# Patient Record
Sex: FEMALE | URBAN - NONMETROPOLITAN AREA
[De-identification: names, ages, dates, MRNs, and addresses within clinical notes are randomized per-mention and may not be internally consistent; named-entity substitution may affect disease eponyms.]

---

## 2018-01-09 ENCOUNTER — OFFICE VISIT (OUTPATIENT)
Dept: PSYCHIATRY | Age: 33
End: 2018-01-09
Payer: MEDICAID

## 2018-01-09 VITALS
BODY MASS INDEX: 20.38 KG/M2 | OXYGEN SATURATION: 100 % | WEIGHT: 115 LBS | SYSTOLIC BLOOD PRESSURE: 142 MMHG | HEIGHT: 63 IN | DIASTOLIC BLOOD PRESSURE: 88 MMHG | HEART RATE: 89 BPM

## 2018-01-09 DIAGNOSIS — F32.A DEPRESSION, UNSPECIFIED DEPRESSION TYPE: ICD-10-CM

## 2018-01-09 DIAGNOSIS — F19.11 DRUG ABUSE IN REMISSION (HCC): Primary | ICD-10-CM

## 2018-01-09 PROCEDURE — 99999 PR OFFICE/OUTPT VISIT,PROCEDURE ONLY: CPT | Performed by: COUNSELOR

## 2018-01-09 PROCEDURE — 90791 PSYCH DIAGNOSTIC EVALUATION: CPT | Performed by: COUNSELOR

## 2018-01-09 RX ORDER — LEVONORGESTREL AND ETHINYL ESTRADIOL 0.15-0.03
KIT ORAL
COMMUNITY
Start: 2017-12-28

## 2018-01-09 NOTE — PROGRESS NOTES
Initial Session Note  [facility]  2018  8:26 AM      Time spent with Patient: 60 minutes  This is patient's first  Therapy appointment. Reason for Consult:  depression, anxiety and stress  Referring Provider: No referring provider defined for this encounter. Pt provided informed consent for the behavioral health program. Discussed with patient model of service to include the limits of confidentiality (i.e. abuse reporting, suicide intervention, etc.) and short-term intervention focused approach. Discussed no show and late cancellation policy. Pt indicated understanding. Yesi Paulson ,a 29 y.o. female, for initial evaluation visit. Reason:    Pt was at [facility] for 28 days and was discharged on  Dec 18, 2017. Pt states she was in rehab for pills (opiates). \"That put everything in to action to get things addressed that never were. \" Pt states she has \"always\" been depressed. However, 6 years ago the [place] of her life\"  and it made her depression and anxiety increase significantly. She is also having episodes of anger and is \"snapping\" a lot. Pt denies SI, HI and AVH at this time. Therapist recommends IOP to pt. However, she lives in [place], [place] and doesn't believe she could make it here 3 times per week. She also prefers 1:1 counseling as she didn't like groups at [facility]     Social History:  Born and raised in [place] by biological parents until her parents  when she was a teenager. Her father was a drug addict. Pt has an older brother and sister. Pt has never been  and has no children. Current Medications:  Scheduled Meds: No current outpatient prescriptions on file.       History:     Past Psychiatric History:   Previous therapy: denies  Previous psychiatric treatment and medication trials: denies  Previous psychiatric hospitalizations: no  Previous diagnoses: no  Previous suicide attempts:no  Family history of mental illness: both sides of my

## 2018-01-19 ENCOUNTER — OFFICE VISIT (OUTPATIENT)
Dept: PSYCHIATRY | Age: 33
End: 2018-01-19
Payer: MEDICAID

## 2018-01-19 VITALS
DIASTOLIC BLOOD PRESSURE: 78 MMHG | WEIGHT: 115 LBS | BODY MASS INDEX: 20.38 KG/M2 | HEIGHT: 63 IN | HEART RATE: 85 BPM | OXYGEN SATURATION: 99 % | SYSTOLIC BLOOD PRESSURE: 130 MMHG

## 2018-01-19 DIAGNOSIS — F32.A DEPRESSION, UNSPECIFIED DEPRESSION TYPE: Primary | ICD-10-CM

## 2018-01-19 DIAGNOSIS — F19.11 DRUG ABUSE IN REMISSION (HCC): ICD-10-CM

## 2018-01-19 PROCEDURE — 99999 PR OFFICE/OUTPT VISIT,PROCEDURE ONLY: CPT | Performed by: COUNSELOR

## 2018-01-19 PROCEDURE — 90832 PSYTX W PT 30 MINUTES: CPT | Performed by: COUNSELOR

## 2018-01-25 ENCOUNTER — OFFICE VISIT (OUTPATIENT)
Dept: PSYCHIATRY | Age: 33
End: 2018-01-25
Payer: MEDICAID

## 2018-01-25 DIAGNOSIS — F32.A DEPRESSION, UNSPECIFIED DEPRESSION TYPE: ICD-10-CM

## 2018-01-25 DIAGNOSIS — F11.21 OPIOID USE DISORDER, SEVERE, IN SUSTAINED REMISSION (HCC): Primary | ICD-10-CM

## 2018-01-25 PROCEDURE — 99204 OFFICE O/P NEW MOD 45 MIN: CPT | Performed by: NURSE PRACTITIONER

## 2018-01-25 RX ORDER — TRAZODONE HYDROCHLORIDE 50 MG/1
TABLET ORAL
Qty: 60 TABLET | Refills: 2 | Status: SHIPPED | OUTPATIENT
Start: 2018-01-25

## 2018-01-25 NOTE — PROGRESS NOTES
[address] EVALUATION    Date of Service:  1/25/2018    Chief Complaint   Patient presents with    Depression       HISTORY OF PRESENT ILLNESS  The patient is a 29 y.o.single   female who is here for psychiatric evaluation due to continual complaints of depression/anxiety. Today patient states, Krystina Hickey made the appt for me. They told me all new patients come to see you. \"  Reports she is having issues with depression and anxiety. States she was originally started on xanax for anxiety, but has been several years. States she has low energy, difficulty sleeping. States she is always in a \"panic mode and I am a ball of nerves all the time. I have a lot of feelings of despair. I have low motivation. \" Appetite is \"kind of low, but I've never had a huge appetite anyway. It hasn't change too much, that I've noticed. \"  Reports her focus/concentration is poor. States her sleep is poor and has racing thoughts at bedtime. Once she falls asleep, she wakes up after 2 to 3 hours or so. Occasionally naps during the day. States she is tired \"mostly all day long. A lot more than I used to be. \"     PSYCHIATRIC HISTORY  Went to rehab at [facility] due to opiate addiction. Currently in therapy here with DACIA Kruse. Denies any other treatment for mental health. History of SIB by cutting when a teenager. States she did it because she was depressed and has been so for \"most of my life. \"  No history of SI/SA/HI or psychosis of any kind. Denies any history of manic behavior. PREVIOUS MED TRIALS  Xanax - perceived some benefit for anxiety. Took several years ago. Ambien - took several years ago     MAYE Natalia 13  Reports depression is on both sides of her family. Stated her father was addicted to drugs. Review of Systems - 12 point review negative today except as reported above  No history of seizures and/or head trauma. Denies pregnancy. Speech:  normal no evidence of language or speech disorder or defect. Language: Naming: intact; Word Finding: intact fluent. Conversation:no evidence of delusions  Behavior:  Cooperative  Mood: depressed  Affect: congruent with mood and full range  Thought Content: negative delusions, hallucinations, obsessions, homicidal and suicidal  No evidence of psychotic or delusional thoughts. Thought Process: linear, goal directed and coherent  Judgement Insight:  normal and appropriate   Gait and Station:normal gait and station and normal balance                         Musculoskeletal: WNL    ASSESSMENT  1. Depression NOS  2. Opioid Use Disorder, severe, in remission  3. PLAN  Start Zoloft 25 mg QAM for depression/anxiety for two weeks, then increase to 50 mg QAM   Start Trazodone 50 - 100 mg QHS PRN sleep   Start mag oil for insomnia/depression/anxiety - printout given during appt  Continue therapy with Humaira  1. The risks, benefits, side effects, indications, contraindications, and adverse effects of the medications have been discussed. Yes.  2. The pt has verbalized understanding and has capacity to give informed consent. 3. The Geovanna Gloria report has been reviewed according to [facility] regulations. 4. Supportive therapy offered. 5. Follow up: Return in about 4 weeks (around 2/22/2018), or if symptoms worsen or fail to improve. 6. The patient has been advised to call with any problems. 7. Controlled substance Treatment Plan: no Rx from this provider.   8. The above listed medications have been continued, modifications in meds and other orders/labs as follows:      Orders Placed This Encounter   Medications    sertraline (ZOLOFT) 50 MG tablet     Sig: Take 1/2 a tab by mouth every morning for two weeks, then increase to 1 tab by mouth every morning     Dispense:  30 tablet     Refill:  2    traZODone (DESYREL) 50 MG tablet     Sig: Take 1 - 2 tabs by mouth at bedtime AS NEEDED for sleep     Dispense:  60 tablet

## 2018-02-01 ENCOUNTER — OFFICE VISIT (OUTPATIENT)
Dept: PSYCHIATRY | Age: 33
End: 2018-02-01
Payer: MEDICAID

## 2018-02-01 VITALS
HEART RATE: 89 BPM | BODY MASS INDEX: 20.38 KG/M2 | OXYGEN SATURATION: 100 % | WEIGHT: 115 LBS | DIASTOLIC BLOOD PRESSURE: 68 MMHG | SYSTOLIC BLOOD PRESSURE: 127 MMHG | HEIGHT: 63 IN

## 2018-02-01 DIAGNOSIS — F11.21 OPIOID USE DISORDER, SEVERE, IN SUSTAINED REMISSION (HCC): Primary | ICD-10-CM

## 2018-02-01 DIAGNOSIS — F32.A DEPRESSION, UNSPECIFIED DEPRESSION TYPE: ICD-10-CM

## 2018-02-01 PROCEDURE — 90832 PSYTX W PT 30 MINUTES: CPT | Performed by: COUNSELOR

## 2018-02-01 PROCEDURE — 99999 PR OFFICE/OUTPT VISIT,PROCEDURE ONLY: CPT | Performed by: COUNSELOR

## 2018-02-01 NOTE — PROGRESS NOTES
Therapy Progress Note  [facility]  [facility]1/2018  11:17 AM      Time spent with Patient: 30 minutes  This is patient's third  Therapy appointment. Reason for Consult:  depression, anxiety and stress  Referring Provider: No referring provider defined for this encounter. Mani Beaver ,a 29 y.o. female, for initial evaluation visit. Pt provided informed consent for the behavioral health program. Discussed with patient model of service to include the limits of confidentiality (i.e. abuse reporting, suicide intervention, etc.) and short-term intervention focused approach. Discussed no show and late cancellation policy. Pt indicated understanding. S:  Pt reports she has been staying at her mother's house since getting out of rehab. However, her mom told her last week she will be going back to [facility] with her  at the end of the month. Pt is nervous about this as she doesn't have a job yet and has no transportation of her own. Her brother who is disabled tells her he will help her out as best he can. She's thinking of moving back in with her boyfriend. Pt states she has been a  for 5 years and knows she could get a job. However, her boyfriend doesn't want her to  as he gets jealous thinking she makes tips because she is flirting. Pt states she knows she needs to have a conversation with him about all of the above. Therapist encourages pt to think about what would be best for herself as she is still early in to her substance abuse recovery. Pt states she knows right now she couldn't handle getting a job that is totally new to her- it would be too overwhelming and cause increased anxiety. Therapist and pt discuss the benefits of keeping a journal. Pt states she use to love to journal and would like to start back. Therapist gave pt a list of positive journal prompts to work on twice a week at home to encourage healthy thinking habits.     Pt doesn't feel like the Trazodone is

## 2018-02-09 ENCOUNTER — TELEPHONE (OUTPATIENT)
Dept: PSYCHIATRY | Age: 33
End: 2018-02-09

## 2018-02-13 RX ORDER — FLUOXETINE HYDROCHLORIDE 20 MG/1
20 CAPSULE ORAL EVERY MORNING
Qty: 30 CAPSULE | Refills: 1 | Status: SHIPPED | OUTPATIENT
Start: 2018-02-13

## 2018-02-15 ENCOUNTER — OFFICE VISIT (OUTPATIENT)
Dept: PSYCHIATRY | Age: 33
End: 2018-02-15
Payer: MEDICAID

## 2018-02-15 DIAGNOSIS — F11.21 OPIOID USE DISORDER, SEVERE, IN SUSTAINED REMISSION (HCC): Primary | ICD-10-CM

## 2018-02-15 DIAGNOSIS — F32.A DEPRESSION, UNSPECIFIED DEPRESSION TYPE: ICD-10-CM

## 2018-02-15 PROCEDURE — 90832 PSYTX W PT 30 MINUTES: CPT | Performed by: COUNSELOR

## 2018-02-15 PROCEDURE — 99999 PR OFFICE/OUTPT VISIT,PROCEDURE ONLY: CPT | Performed by: COUNSELOR

## 2018-02-15 NOTE — PROGRESS NOTES
depression and anxiety  3. Discuss Positive journal prompts  4.  Discuss emotion regulation     Pt interventions:  Provided handout on  emotion regulation, Trained in strategies for increasing balanced thinking, Discussed and set plan for behavioral activation, Provided education, Discussed self-care (sleep, nutrition, rewarding activities, social support, exercise), Motivational Interviewing to determine importance and readiness for change, Discussed potential barriers to change, Discussed use of imagery, distractions, relaxation, mood management, communication training, questioning unhelpful thinking, problem-solving, and behavioral activation to manage pain, Supportive techniques, Emphasized self-care as important for managing overall health and CBT to target depression and anxiety      [facility]